# Patient Record
(demographics unavailable — no encounter records)

---

## 2025-03-28 NOTE — HISTORY OF PRESENT ILLNESS
[FreeTextEntry1] : 60 y.o. female with h/o hypothyroidism s/p thyroidectomy in 1999 and prediabetes here for follow up visit.    Surgery performed for thyroid nodules by Shu Gomez. Patient reports benign disease. She was taking [place] 90 mg daily since July 2020 but reports trouble sleeping then switched to Synthroid. Her dose was decreased to Synthroid 88 mcg daily in 12/2022.  She is now taking Synthroid 125 mcg daily since June 2024.   Reports struggle with weight loss. No neck complaints. C/o fatigue and struggle with sleep.  No hair loss or skin changes.  No family history of thyroid disease.  Reports worsening with GI symptoms with abdominal pain and bloating. Planning to follow up with her GI specialist. She also reports increase in hot flashes and follows with GYN. Currently being treated with estrogen vaginal suppository.   She reports more headaches. She does see a chiropractor.   Had EGD and colonoscopy in May 2024 with internal hemorrhoids and polyp removal. Had EGD and colonoscopy on July 9th, 2018. Had severe abdominal pain and went to ER in April 2019. Following with GI. Had normal CT scan. Diagnosed with small bowel bacterial overgrowth.  Had back injury in the end of August 2018 from a slip. She was seeing neurology and chiropractor for treatment of herniated discs. Received steroid injection in the low back which helped. Was in the hospital on 1/13/2021 for SOB. Does report right knee meniscus tear in 9/2021 and did see ortho. Less left knee pain and she was seeing ortho. Completed PT.  Had left knee pain at the end of February 2024 and saw ortho on March 7th 2024 and received steroid injection.  Had gel injection in September 2024 with ortho and now knee is doing better.    Did have COVID-19 in August 2023 while in [facility].   In regard to prediabetes, tries to watch diet but does have ice cream at night. Usually eats 2 meals. Weight is stable. Stopped exercising.   In regard to bone health/osteopenia, now post-menopausal.  Eats leafy greens but less because of bowel issues. No bone pain. No fractures. Stopped Effexor for hot flashes because of increased LFTs.    DEXA scan on 3/31/17 showed left femoral neck -0.4 and total hip 0.3 and spine 1.1.   DEXA scan in May 2019 shows spine 0.2, left femoral neck -0.8 and total hip -0.3 which is normal.  DEXA scan in August 2021 shows spine 0.1 which is stable, left femoral neck -1.0 and total hip -0.5 which are stable. 1/3 radius is -0.5 which is stable. DEXA scan in 9/2023 shows spine 0.1 which is stable, left femoral neck -1.1 which is a 2.1% decrease and total hip -0.7 which is a 2.7% decrease. 1/3 radius is -0.9 which is a 4.1% decrease.

## 2025-03-28 NOTE — REVIEW OF SYSTEMS
[Fatigue] : fatigue [Joint Pain] : joint pain [Back Pain] : back pain [Stress] : stress [Negative] : Endocrine [Abdominal Pain] : abdominal pain [Gas/Bloating] : gas/bloating [Headaches] : headaches [Recent Weight Gain (___ Lbs)] : no recent weight gain [Recent Weight Loss (___ Lbs)] : no recent weight loss [Polyuria] : no polyuria [Swelling] : no swelling

## 2025-03-28 NOTE — PHYSICAL EXAM
[Alert] : alert [No Acute Distress] : no acute distress [Normal Sclera/Conjunctiva] : normal sclera/conjunctiva [EOMI] : extra ocular movement intact [No LAD] : no lymphadenopathy [Well Healed Scar] : well healed scar [No Respiratory Distress] : no respiratory distress [Clear to Auscultation] : lungs were clear to auscultation bilaterally [Normal S1, S2] : normal S1 and S2 [Regular Rhythm] : with a regular rhythm [No Edema] : no peripheral edema [Normal Bowel Sounds] : normal bowel sounds [Not Tender] : non-tender [Soft] : abdomen soft [Normal Anterior Cervical Nodes] : no anterior cervical lymphadenopathy [No Clubbing, Cyanosis] : no clubbing  or cyanosis of the fingernails [No Rash] : no rash [Normal Reflexes] : deep tendon reflexes were 2+ and symmetric [Normal Affect] : the affect was normal [Normal Mood] : the mood was normal

## 2025-03-28 NOTE — ASSESSMENT
[FreeTextEntry1] : 60 y.o. female with h/o hypothyroidism s/p surgery, prediabetes, osteopenia and fatigue.  1. Hypothyroidism s/p surgery- Will check TFTs and will adjust Synthroid if needed. Goal TSH is below 2.5 to 3.0. No need for TSH suppression given benign thyroid disease.   2. Prediabetes with hyperlipidemia- Encouraged a carbohydrate consistent, low fat diet and exercise. Will monitor for now. Will check CMP, lipids and Hba1c.  3. Osteopenia- DEXA scan performed in 9/2023 shows spine 0.1 which is stable, left femoral neck -1.1 which is a 2.1% decrease and total hip -0.7 which is a 2.7% decrease. 1/3 radius is -0.9 which is a 4.1% decrease.  Risk of fracture is average. Encouraged weight bearing activity. Discussed appropriate calcium and vitamin D intake. Normal 25 vitamin D level. Will monitor for now. Repeat DEXA scan in 9/2025.   4. Fatigue- Will check CBC, iron studies and vitamin B12 and folate.   If stable, follow up in 6 months. Labs drawn in the office today.  Follow up with GI

## 2025-03-28 NOTE — PAST MEDICAL HISTORY
Called patient to confirm paperwork was faxed completed and the host has received. Paperwork faxed and completed 04/19/2023 @0429. Patient is aware and satisfied and was told to call Dr. Pak office if there is any questions or concerns.     [Menopause Age____] : age at menopause was [unfilled] [Total Preg ___] : G[unfilled] [Live Births ___] : P[unfilled]  [History of Hormone Replacement Treatment] : has no history of hormone replacement treatment

## 2025-05-01 NOTE — PHYSICAL EXAM
[de-identified] : Right knee exam  Skin: Clean, dry, intact Inspection: No obvious malalignment, no masses, + swelling, +minimal effusion, crepitus in the patellofemoral joint Pulses: 2+ DP/PT pulses ROM: 0-130 degrees of flexion.  minimal pain with deep knee flexion Tenderness: Mild MJLT.  Mild LJLT. No pain over the patella facets. No pain to the quadriceps tendon. No pain to the patella tendon.  mild posterior knee tenderness. Stability: Stable to varus, valgus. Negative lachman testing. Negative anterior drawer, negative posterior drawer. Strength: 5/5 Q/H/TA/GS/EHL, without atrophy Neuro: In tact to light touch throughout, DTR's normal Additional tests: Positive McMurrays test, Negative patellar grind test.  [de-identified] : Images were reviewed from ER dated 9.13.21.   Multiple images right knee showed no evidence of bony injury, or valdez dislocation. There is trace patellofemoral/lateral underlying degenerative arthritic change seen. Overall alignment is maintained. Otherwise unremarkable.  MRI right knee dated 9/15/2021 shows evidence of a radial tear through the posterior root attachment medial meniscus.  Chondral fissuring medial femoral condyle/patellofemoral joint.  Mild extrusion.  The following radiographs were ordered and read by me during this patients visit. I reviewed each radiograph in detail with the patient and discussed the findings as highlighted below.   4 views of the right knee were obtained, 05/01/2025, that show no acute fracture or dislocation. There is moderate medial, mild lateral and moderate patellofemoral degenerative changes seen. There is no significant malalignment. No significant other obvious osseous abnormality, otherwise unremarkable.

## 2025-05-01 NOTE — HISTORY OF PRESENT ILLNESS
[de-identified] : 60-year-old female presents today with recurrent right knee pain x 1 month. Patient has a history of right root tear medial meniscus in 2021. Patient states that she was doing well until 1 month ago. The pain is constant and worse with walking.  She also c/o swelling. Bracing provides relief. She takes Meloxicam without relief. Denies numbness or tingling.  She is looking for possible steroid injection.

## 2025-05-01 NOTE — HISTORY OF PRESENT ILLNESS
[de-identified] : 60-year-old female presents today with recurrent right knee pain x 1 month. Patient has a history of right root tear medial meniscus in 2021. Patient states that she was doing well until 1 month ago. The pain is constant and worse with walking.  She also c/o swelling. Bracing provides relief. She takes Meloxicam without relief. Denies numbness or tingling.  She is looking for possible steroid injection.

## 2025-05-01 NOTE — DISCUSSION/SUMMARY
[de-identified] : 60-year-old female with right knee OA  Patient presents with return of right knee pain consistent with known medial root meniscus tear and associated progressive tricompartmental chondral degeneration as well as mild meniscal extrusion. Patient presents with worsening degree of arthrosis, but symptoms are relatively mild. I again discussed the treatment of degenerative arthritis with the patient at length today and likely future progression of the disease with intermittent periods of joint inflammatory exacerbation. Nonoperative treatment modalities include; brace use, weight reduction, activity modification/restriction, low impact exercise, PRN use of acetaminophen or anti-inflammatory medication (if able), natural anti-inflammatory supplements, glucosamine/chondroitin, and HEP/physical therapy (for strengthening and conditioning). Cortisone injection can be performed for periods of acute exacerbation and HA injections may be appropriate for longer-term pain management. Definitive treatment may include consideration of total joint arthroplasty for persistent symptoms refractory to conservative care.   Recommendations: Continued symptomatic management and conservative care as outlined.   Injection therapy was provided for therapeutic and symptomatic relief.   Follow up as needed.

## 2025-05-01 NOTE — DISCUSSION/SUMMARY
[de-identified] : 60-year-old female with right knee OA  Patient presents with return of right knee pain consistent with known medial root meniscus tear and associated progressive tricompartmental chondral degeneration as well as mild meniscal extrusion. Patient presents with worsening degree of arthrosis, but symptoms are relatively mild. I again discussed the treatment of degenerative arthritis with the patient at length today and likely future progression of the disease with intermittent periods of joint inflammatory exacerbation. Nonoperative treatment modalities include; brace use, weight reduction, activity modification/restriction, low impact exercise, PRN use of acetaminophen or anti-inflammatory medication (if able), natural anti-inflammatory supplements, glucosamine/chondroitin, and HEP/physical therapy (for strengthening and conditioning). Cortisone injection can be performed for periods of acute exacerbation and HA injections may be appropriate for longer-term pain management. Definitive treatment may include consideration of total joint arthroplasty for persistent symptoms refractory to conservative care.   Recommendations: Continued symptomatic management and conservative care as outlined.   Injection therapy was provided for therapeutic and symptomatic relief.   Follow up as needed.

## 2025-05-01 NOTE — ADDENDUM
[FreeTextEntry1] : This note was written by Maria R Dutton on 05/01/2025 acting solely as a scribe for Dr. Lebron Conn.   All medical record entries made by the Scribe were at my, Dr. Lebron Conn, direction and personally dictated by me on 05/01/2025. I have personally reviewed the chart and agree that the record accurately reflects my personal performance of the history, physical exam, assessment and plan.

## 2025-05-01 NOTE — PROCEDURE
[de-identified] : Injection: Right knee joint.  Indication: Effusion   A discussion was had with the patient regarding this procedure and all questions were answered. All risks, benefits and alternatives were discussed. These included but were not limited to bleeding, infection, and allergic reaction. Alcohol was used to clean the skin, and betadine was used to sterilize and prep the area in the supero-lateral aspect of the right knee. Ethyl chloride spray was then used as a topical anesthetic. A 21-gauge needle was used to inject 4cc of 1% lidocaine and 1cc of 40mg/ml methylprednisolone into the knee. A sterile bandage was then applied. The patient tolerated the procedure well and there were no complications.

## 2025-05-01 NOTE — PROCEDURE
[de-identified] : Injection: Right knee joint.  Indication: Effusion   A discussion was had with the patient regarding this procedure and all questions were answered. All risks, benefits and alternatives were discussed. These included but were not limited to bleeding, infection, and allergic reaction. Alcohol was used to clean the skin, and betadine was used to sterilize and prep the area in the supero-lateral aspect of the right knee. Ethyl chloride spray was then used as a topical anesthetic. A 21-gauge needle was used to inject 4cc of 1% lidocaine and 1cc of 40mg/ml methylprednisolone into the knee. A sterile bandage was then applied. The patient tolerated the procedure well and there were no complications.

## 2025-05-01 NOTE — PHYSICAL EXAM
[de-identified] : Right knee exam  Skin: Clean, dry, intact Inspection: No obvious malalignment, no masses, + swelling, +minimal effusion, crepitus in the patellofemoral joint Pulses: 2+ DP/PT pulses ROM: 0-130 degrees of flexion.  minimal pain with deep knee flexion Tenderness: Mild MJLT.  Mild LJLT. No pain over the patella facets. No pain to the quadriceps tendon. No pain to the patella tendon.  mild posterior knee tenderness. Stability: Stable to varus, valgus. Negative lachman testing. Negative anterior drawer, negative posterior drawer. Strength: 5/5 Q/H/TA/GS/EHL, without atrophy Neuro: In tact to light touch throughout, DTR's normal Additional tests: Positive McMurrays test, Negative patellar grind test.  [de-identified] : Images were reviewed from ER dated 9.13.21.   Multiple images right knee showed no evidence of bony injury, or valdez dislocation. There is trace patellofemoral/lateral underlying degenerative arthritic change seen. Overall alignment is maintained. Otherwise unremarkable.  MRI right knee dated 9/15/2021 shows evidence of a radial tear through the posterior root attachment medial meniscus.  Chondral fissuring medial femoral condyle/patellofemoral joint.  Mild extrusion.  The following radiographs were ordered and read by me during this patients visit. I reviewed each radiograph in detail with the patient and discussed the findings as highlighted below.   4 views of the right knee were obtained, 05/01/2025, that show no acute fracture or dislocation. There is moderate medial, mild lateral and moderate patellofemoral degenerative changes seen. There is no significant malalignment. No significant other obvious osseous abnormality, otherwise unremarkable.